# Patient Record
Sex: FEMALE | Race: WHITE | Employment: PART TIME
[De-identification: names, ages, dates, MRNs, and addresses within clinical notes are randomized per-mention and may not be internally consistent; named-entity substitution may affect disease eponyms.]

---

## 2017-01-03 ENCOUNTER — TELEPHONE (OUTPATIENT)
Dept: OBGYN | Facility: CLINIC | Age: 42
End: 2017-01-03

## 2017-01-03 DIAGNOSIS — N76.0 BV (BACTERIAL VAGINOSIS): Primary | ICD-10-CM

## 2017-01-03 DIAGNOSIS — B96.89 BV (BACTERIAL VAGINOSIS): Primary | ICD-10-CM

## 2017-01-03 RX ORDER — METRONIDAZOLE 500 MG/1
500 TABLET ORAL 2 TIMES DAILY
Qty: 14 TABLET | Refills: 0 | Status: SHIPPED | OUTPATIENT
Start: 2017-01-03 | End: 2017-01-10

## 2017-03-01 ENCOUNTER — TELEPHONE (OUTPATIENT)
Dept: OBGYN | Facility: CLINIC | Age: 42
End: 2017-03-01

## 2017-03-01 DIAGNOSIS — R35.0 FREQUENT URINATION: ICD-10-CM

## 2017-03-01 DIAGNOSIS — R39.15 URGENCY OF URINATION: ICD-10-CM

## 2017-03-01 DIAGNOSIS — R39.89 SENSATION OF PRESSURE IN BLADDER AREA: Primary | ICD-10-CM

## 2017-03-01 RX ORDER — NITROFURANTOIN 25; 75 MG/1; MG/1
100 CAPSULE ORAL 2 TIMES DAILY
Qty: 10 CAPSULE | Refills: 0 | Status: SHIPPED | OUTPATIENT
Start: 2017-03-01 | End: 2017-03-06

## 2017-03-03 ENCOUNTER — TELEPHONE (OUTPATIENT)
Dept: OBGYN | Facility: CLINIC | Age: 42
End: 2017-03-03

## 2017-12-14 ENCOUNTER — OFFICE VISIT (OUTPATIENT)
Dept: OBGYN CLINIC | Age: 42
End: 2017-12-14
Payer: COMMERCIAL

## 2017-12-14 ENCOUNTER — HOSPITAL ENCOUNTER (OUTPATIENT)
Age: 42
Setting detail: SPECIMEN
Discharge: HOME | End: 2017-12-14
Payer: COMMERCIAL

## 2017-12-14 VITALS
WEIGHT: 133.8 LBS | DIASTOLIC BLOOD PRESSURE: 60 MMHG | SYSTOLIC BLOOD PRESSURE: 96 MMHG | BODY MASS INDEX: 22.29 KG/M2 | HEIGHT: 65 IN

## 2017-12-14 DIAGNOSIS — Z12.39 SCREENING FOR BREAST CANCER: ICD-10-CM

## 2017-12-14 DIAGNOSIS — Z01.419 VISIT FOR GYNECOLOGIC EXAMINATION: Primary | ICD-10-CM

## 2017-12-14 PROCEDURE — 99396 PREV VISIT EST AGE 40-64: CPT | Performed by: OBSTETRICS & GYNECOLOGY

## 2017-12-14 ASSESSMENT — ENCOUNTER SYMPTOMS
SHORTNESS OF BREATH: 0
CONSTIPATION: 0
DIARRHEA: 0
BACK PAIN: 0
COUGH: 0
ABDOMINAL PAIN: 0
ABDOMINAL DISTENTION: 0

## 2017-12-14 NOTE — PROGRESS NOTES
Subjective:      Patient ID: Meche Serna is a 38 y.o. female. HPI   Meche Serna is a 38 y.o. , here for her annual exam.  The patient was seen and examined. The patients past medical, surgical, social and family history were reviewed. Current medications and allergies were reviewed, and documented in the chart. The patient still has no menses after the ablation. She does still have occasional pain of the endometriosis, mostly in the right lower quadrant, but it is tolerable. Her children are 0 and still into sports. Her  is also a referee, so it is a very busy time  Menstrual history: No menses since ablation. She knows her cycles  Birth control: BTL    Blood pressure 96/60, height 5' 5\" (1.651 m), weight 133 lb 12.8 oz (60.7 kg), not currently breastfeeding.   Wt Readings from Last 3 Encounters:   17 133 lb 12.8 oz (60.7 kg)   16 123 lb (55.8 kg)   12/01/15 133 lb (60.3 kg)     Recent Results (from the past 8736 hour(s))   UA W/REFLEX CULTURE    Collection Time: 17  5:01 PM   Result Value Ref Range    Color, UA YELLOW YEL    Turbidity UA CLOUDY (A) CLEAR    Glucose, Ur NEGATIVE NEG    Bilirubin Urine NEGATIVE NEG    Ketones, Urine NEGATIVE NEG    Specific Gravity, UA 1.015 1.005 - 1.030    Urine Hgb 3+ (A) NEG    pH, UA 6.0 5.0 - 8.0    Protein, UA 1+ (A) NEG    Urobilinogen, Urine Normal NORM    Nitrite, Urine NEGATIVE NEG    Leukocyte Esterase, Urine LARGE (A) NEG    Urinalysis Comments NOT REPORTED    Microscopic Urinalysis    Collection Time: 17  5:01 PM   Result Value Ref Range    -          WBC, UA 50  0 - 5 /HPF    RBC, UA 20 TO 50 0 - 2 /HPF    Casts UA NOT REPORTED /LPF    Crystals UA NOT REPORTED NONE /HPF    Epithelial Cells UA 10 TO 20 /HPF    Renal Epithelial, Urine NOT REPORTED 0 /HPF    Bacteria, UA MODERATE (A) NONE    Mucus, UA NOT REPORTED NONE    Trichomonas, UA NOT REPORTED NONE    Amorphous, UA NOT REPORTED NONE    Other Observations UA NOT REPORTED NREQ    Yeast, UA NOT REPORTED NONE   Urine culture clean catch    Collection Time: 03/01/17  5:27 PM   Result Value Ref Range    Specimen Description       . CLEAN CATCH URINE Performed at [address]    Specimen Description  [facility], [address] [facility] [phone]     Special Requests NOT REPORTED     Culture ESCHERICHIA COLI 50 to 100,000 CFU/ML (A)     Culture       Performed at [facility] [address], [address] [place] [address] [phone]    Status FINAL 03/03/2017     Organism EC        Susceptibility    Escherichia coli - JOSE ROBERTO     amikacin NOT REPORTED       ampicillin >=32 RESISTANT Resistant      ampicillin-sulbactam NOT REPORTED       aztreonam <=1 SUSCEPTIBLE Sensitive      ceFAZolin Value in next row Intermediate       16 INTERMEDIATEInterpretive criteria when Cefazolin results are used for the therapy of uncomplicated UTIs due to E. coli, K. pneumoniae, and P. mirabilis or to predict the potential effectiveness of oral cephalosporins (eg. Cephalexin) due to E. coli, K. pneumoniae, and P. mirabilis. cefepime Value in next row        16 INTERMEDIATEInterpretive criteria when Cefazolin results are used for the therapy of uncomplicated UTIs due to E. coli, K. pneumoniae, and P. mirabilis or to predict the potential effectiveness of oral cephalosporins (eg. Cephalexin) due to E. coli, K. pneumoniae, and P. mirabilis. cefTRIAXone Value in next row Sensitive       16 INTERMEDIATEInterpretive criteria when Cefazolin results are used for the therapy of uncomplicated UTIs due to E. coli, K. pneumoniae, and P. mirabilis or to predict the potential effectiveness of oral cephalosporins (eg. Cephalexin) due to E. coli, K. pneumoniae, and P. mirabilis.      ciprofloxacin Value in next row Sensitive       16 INTERMEDIATEInterpretive criteria when Cefazolin results are used for the therapy of uncomplicated UTIs due to E. coli, K. pneumoniae, and P. Tobacco    Never Used     History   Alcohol Use    Yes     Comment: rarely       MEDICATIONS:  Current Outpatient Prescriptions   Medication Sig Dispense Refill    Fish Oil OIL by Does not apply route.  Calcium Carbonate-Vitamin D (CALCIUM + D PO) Take  by mouth.  Multiple Vitamin (MULTI VITAMIN DAILY PO) Take  by mouth.  ibuprofen (ADVIL;MOTRIN) 200 MG tablet Take 200 mg by mouth every 8 hours as needed. No current facility-administered medications for this visit. ALLERGIES:  Review of patient's allergies indicates no known allergies. Review of Systems   Constitutional: Negative for appetite change and fatigue. HENT: Negative for congestion and hearing loss. Eyes: Negative for visual disturbance. Respiratory: Negative for cough and shortness of breath. Cardiovascular: Negative for chest pain and palpitations. Gastrointestinal: Negative for abdominal distention, abdominal pain, constipation and diarrhea. Genitourinary: Negative for flank pain, frequency, menstrual problem, pelvic pain and vaginal discharge. Musculoskeletal: Negative for back pain. Neurological: Negative for syncope and headaches. Psychiatric/Behavioral: Negative for behavioral problems. Objective:   Physical Exam   Constitutional: She is oriented to person, place, and time. She appears well-developed and well-nourished. Eyes: Pupils are equal, round, and reactive to light. Neck: No tracheal deviation present. No thyromegaly present. Cardiovascular: Normal rate, regular rhythm and normal heart sounds. Pulmonary/Chest: Effort normal and breath sounds normal. No respiratory distress. Right breast exhibits no inverted nipple. Left breast exhibits no inverted nipple, no mass, no nipple discharge, no skin change and no tenderness. Breasts are symmetrical.   Abdominal: Soft. Bowel sounds are normal. She exhibits no distension and no mass. There is no tenderness.  There is no CVA

## 2017-12-27 DIAGNOSIS — Z01.419 VISIT FOR GYNECOLOGIC EXAMINATION: ICD-10-CM

## 2017-12-27 LAB — CYTOLOGY REPORT: NORMAL

## 2018-01-17 DIAGNOSIS — Z12.31 SCREENING MAMMOGRAM, ENCOUNTER FOR: ICD-10-CM

## 2018-01-17 DIAGNOSIS — Z80.3 FH: BREAST CANCER: ICD-10-CM

## 2018-03-15 ENCOUNTER — OFFICE VISIT (OUTPATIENT)
Dept: OBGYN CLINIC | Age: 43
End: 2018-03-15
Payer: COMMERCIAL

## 2018-03-15 ENCOUNTER — HOSPITAL ENCOUNTER (OUTPATIENT)
Age: 43
Setting detail: SPECIMEN
Discharge: HOME | End: 2018-03-15
Payer: COMMERCIAL

## 2018-03-15 VITALS
HEIGHT: 65 IN | WEIGHT: 132 LBS | SYSTOLIC BLOOD PRESSURE: 100 MMHG | DIASTOLIC BLOOD PRESSURE: 70 MMHG | BODY MASS INDEX: 21.99 KG/M2

## 2018-03-15 DIAGNOSIS — N94.89 LABIAL SWELLING: ICD-10-CM

## 2018-03-15 DIAGNOSIS — N94.89 LABIAL SWELLING: Primary | ICD-10-CM

## 2018-03-15 PROCEDURE — 99213 OFFICE O/P EST LOW 20 MIN: CPT | Performed by: OBSTETRICS & GYNECOLOGY

## 2018-03-15 NOTE — PROGRESS NOTES
Patient is a 38 y.o. Benja Orona  seen with total face to face time of 15 minutes. More than 50% of this visit was on counseling and education regarding her   1. Labial swelling  Herpes Simplex Virus (HSV) I/II Antibodies IgG & IgM    and her options. She was also counseled on her preventative health maintenance recommendations and follow-up. Blood pressure 100/70, height 5' 5\" (1.651 m), weight 132 lb (59.9 kg), not currently breastfeeding. Recent Results (from the past 8736 hour(s))   GYN Cytology    Collection Time: 12/14/17  1:21 PM   Result Value Ref Range    Cytology Report       (NOTE)  [id number]  Informative  [facility]  ANATOMIC PATHOLOGY  [address] [place] [address], [facility] [address]. [place], [address]  [phone]  Fax: [phone]  GYNECOLOGIC CYTOLOGY REPORT    Patient Name: Gurpreet Sam  MR#: [id number]  Specimen #[id number]  Source:  1: Cervical material, (ThinPrep vial, Imaging-assisted review)    Clinical History  Z01.419 Routine gyn exam without abnormal findings  High risk HPV DNA testing is requested if the diagnosis is abnormal    INTERPRETATION    Cervical material, (ThinPrep vial, Imaging-assisted review):  Specimen Adequacy:      Satisfactory for evaluation.      - Endocervical/transformation zone component present. Descriptive Diagnosis:      Negative for intraepithelial lesion or malignancy. Cytotechnologist:   NAHED Reid(ASCP)  **Electronically Signed Out**  mk/12/27/2017         Patient presents with lesions around the rectum and the left labia which started 4 days ago. She denies fever or trouble urinating. She also has a discharge. I did cultures and affirm, but on exam, I feel they are nothing more than small bacterial inclusions. They could be HPV condylomata and less likely herpes.   I will draw herpes titers to rule out out, and did affirm and cultures to rule out other infections, but I suggest that she probably would do best to just

## 2018-03-16 LAB
C TRACH DNA GENITAL QL NAA+PROBE: NEGATIVE
DIRECT EXAM: ABNORMAL
Lab: ABNORMAL
N. GONORRHOEAE DNA: NEGATIVE
SPECIMEN DESCRIPTION: ABNORMAL
STATUS: ABNORMAL

## 2018-03-19 DIAGNOSIS — B96.89 BV (BACTERIAL VAGINOSIS): Primary | ICD-10-CM

## 2018-03-19 DIAGNOSIS — N76.0 BV (BACTERIAL VAGINOSIS): Primary | ICD-10-CM

## 2018-03-19 LAB
HERPES SIMPLEX VIRUS 1 IGG: 0.13
HERPES SIMPLEX VIRUS 2 IGG: 0.1
HERPES TYPE 1/2 IGM COMBINED: 1.07

## 2018-03-19 RX ORDER — METRONIDAZOLE 500 MG/1
500 TABLET ORAL 2 TIMES DAILY
Qty: 14 TABLET | Refills: 0 | Status: SHIPPED | OUTPATIENT
Start: 2018-03-19 | End: 2018-03-26

## 2018-03-20 DIAGNOSIS — Z11.59: Primary | ICD-10-CM

## 2018-04-03 ENCOUNTER — HOSPITAL ENCOUNTER (OUTPATIENT)
Age: 43
Setting detail: SPECIMEN
Discharge: HOME | End: 2018-04-03
Payer: COMMERCIAL

## 2018-04-03 DIAGNOSIS — Z11.59: ICD-10-CM

## 2018-04-05 LAB
HERPES SIMPLEX VIRUS 1 IGG: 0.34
HERPES SIMPLEX VIRUS 2 IGG: 2.84
HERPES TYPE 1/2 IGM COMBINED: 4.37

## 2018-04-06 RX ORDER — VALACYCLOVIR HYDROCHLORIDE 500 MG/1
500 TABLET, FILM COATED ORAL 2 TIMES DAILY
Qty: 14 TABLET | Refills: 0 | Status: SHIPPED | OUTPATIENT
Start: 2018-04-06 | End: 2018-04-13

## 2018-04-16 ENCOUNTER — OFFICE VISIT (OUTPATIENT)
Dept: OBGYN CLINIC | Age: 43
End: 2018-04-16
Payer: COMMERCIAL

## 2018-04-16 VITALS
BODY MASS INDEX: 22.19 KG/M2 | WEIGHT: 133.2 LBS | HEIGHT: 65 IN | SYSTOLIC BLOOD PRESSURE: 118 MMHG | DIASTOLIC BLOOD PRESSURE: 82 MMHG

## 2018-04-16 DIAGNOSIS — A60.09 HERPES GENITALIS IN WOMEN: Primary | ICD-10-CM

## 2018-04-16 PROCEDURE — 99213 OFFICE O/P EST LOW 20 MIN: CPT | Performed by: OBSTETRICS & GYNECOLOGY

## 2018-12-19 ENCOUNTER — OFFICE VISIT (OUTPATIENT)
Dept: OBGYN CLINIC | Age: 43
End: 2018-12-19
Payer: COMMERCIAL

## 2018-12-19 ENCOUNTER — HOSPITAL ENCOUNTER (OUTPATIENT)
Age: 43
Setting detail: SPECIMEN
Discharge: HOME | End: 2018-12-19
Payer: COMMERCIAL

## 2018-12-19 VITALS
SYSTOLIC BLOOD PRESSURE: 110 MMHG | DIASTOLIC BLOOD PRESSURE: 70 MMHG | HEIGHT: 65 IN | WEIGHT: 127.2 LBS | BODY MASS INDEX: 21.19 KG/M2

## 2018-12-19 DIAGNOSIS — Z12.39 SCREENING FOR BREAST CANCER: ICD-10-CM

## 2018-12-19 DIAGNOSIS — Z01.419 VISIT FOR GYNECOLOGIC EXAMINATION: Primary | ICD-10-CM

## 2018-12-19 DIAGNOSIS — R10.2 PELVIC PAIN IN FEMALE: ICD-10-CM

## 2018-12-19 PROCEDURE — 99396 PREV VISIT EST AGE 40-64: CPT | Performed by: OBSTETRICS & GYNECOLOGY

## 2018-12-19 ASSESSMENT — ENCOUNTER SYMPTOMS
ABDOMINAL PAIN: 0
SHORTNESS OF BREATH: 0
CONSTIPATION: 0
DIARRHEA: 0
BACK PAIN: 0
ABDOMINAL DISTENTION: 0
COUGH: 0

## 2018-12-19 NOTE — PROGRESS NOTES
Subjective:      Patient ID: Genaro Walls is a 37 y.o. female. HPI   Genaro Walls is a 37 y.o. , here for her annual exam.  The patient was seen and examined. The patients past medical, surgical, social and family history were reviewed. Current medications and allergies were reviewed, and documented in the chart. The patient has no menses since her ablation. However, she still knows that she cycles, and she gets pain on alternating sides once a month and can shoot down to the leg. She does have a diagnosis of endometriosis from laparoscopy some years ago. She says the pain does not interfere with her life or sex. I offered to do another laparoscopy, or give her treatment for endometriosis and she finds neither necessary at this point. More, she will agree to do a laparoscopy to be sure there is no other problems. Her children are doing well. Her son who 6 inches last year. He is now 15 and her daughter is 5, both are interested in sports. Menstrual history: No bleeding since ablation  Birth control: Bilateral tubal ligation    Blood pressure 110/70, height 5' 5\" (1.651 m), weight 127 lb 3.2 oz (57.7 kg), not currently breastfeeding. Wt Readings from Last 3 Encounters:   18 127 lb 3.2 oz (57.7 kg)   18 133 lb 3.2 oz (60.4 kg)   03/15/18 132 lb (59.9 kg)     Recent Results (from the past 8736 hour(s))   HERPES PROFILE    Collection Time: 03/15/18  3:30 PM   Result Value Ref Range    HSV I IgG 0.13 <0.91    HSV II IgG 0.10 <0.91    Herpes Type 1/2 IgM Combined 1.07 (H) <0.91   VAGINITIS DNA PROBE    Collection Time: 03/15/18 10:47 PM   Result Value Ref Range    Specimen Description . VAGINA     Special Requests NOT REPORTED     Direct Exam POSITIVE for Gardnerella vaginalis. (A)     Direct Exam NEGATIVE for Trichomonas vaginalis     Direct Exam NEGATIVE for Candida sp.      Direct Exam       Method of testing is a DNA probe intended for detection and identification of

## 2019-01-07 ENCOUNTER — PROCEDURE VISIT (OUTPATIENT)
Dept: OBGYN CLINIC | Age: 44
End: 2019-01-07
Payer: COMMERCIAL

## 2019-01-07 DIAGNOSIS — R10.2 PELVIC PAIN IN FEMALE: ICD-10-CM

## 2019-01-07 PROCEDURE — 76830 TRANSVAGINAL US NON-OB: CPT | Performed by: OBSTETRICS & GYNECOLOGY

## 2019-01-07 PROCEDURE — 76857 US EXAM PELVIC LIMITED: CPT | Performed by: OBSTETRICS & GYNECOLOGY

## 2019-01-08 LAB
HPV SAMPLE: NORMAL
HPV SOURCE: NORMAL
HPV, GENOTYPE 16: NOT DETECTED
HPV, GENOTYPE 18: NOT DETECTED
HPV, HIGH RISK OTHER: NOT DETECTED
HPV, INTERPRETATION: NORMAL

## 2019-01-09 LAB — CYTOLOGY REPORT: NORMAL

## 2019-02-14 DIAGNOSIS — Z12.39 SCREENING FOR BREAST CANCER: ICD-10-CM

## 2019-12-23 ENCOUNTER — HOSPITAL ENCOUNTER (OUTPATIENT)
Age: 44
Setting detail: SPECIMEN
Discharge: HOME | End: 2019-12-23
Payer: COMMERCIAL

## 2019-12-23 ENCOUNTER — OFFICE VISIT (OUTPATIENT)
Dept: OBGYN CLINIC | Age: 44
End: 2019-12-23
Payer: COMMERCIAL

## 2019-12-23 VITALS
SYSTOLIC BLOOD PRESSURE: 112 MMHG | BODY MASS INDEX: 21.33 KG/M2 | WEIGHT: 128 LBS | DIASTOLIC BLOOD PRESSURE: 70 MMHG | HEIGHT: 65 IN

## 2019-12-23 DIAGNOSIS — Z01.419 WOMEN'S ANNUAL ROUTINE GYNECOLOGICAL EXAMINATION: Primary | ICD-10-CM

## 2019-12-23 DIAGNOSIS — Z12.31 ENCOUNTER FOR SCREENING MAMMOGRAM FOR BREAST CANCER: ICD-10-CM

## 2019-12-23 PROCEDURE — 99396 PREV VISIT EST AGE 40-64: CPT | Performed by: OBSTETRICS & GYNECOLOGY

## 2019-12-23 ASSESSMENT — ENCOUNTER SYMPTOMS
COUGH: 0
SHORTNESS OF BREATH: 0
DIARRHEA: 0
BACK PAIN: 0
CONSTIPATION: 0
ABDOMINAL DISTENTION: 0
ABDOMINAL PAIN: 0

## 2020-01-06 LAB — CYTOLOGY REPORT: NORMAL

## 2020-03-31 RX ORDER — VALACYCLOVIR HYDROCHLORIDE 500 MG/1
500 TABLET, FILM COATED ORAL 2 TIMES DAILY
Qty: 14 TABLET | Refills: 0 | Status: SHIPPED | OUTPATIENT
Start: 2020-03-31 | End: 2020-04-07

## 2021-01-11 ENCOUNTER — OFFICE VISIT (OUTPATIENT)
Dept: OBGYN CLINIC | Age: 46
End: 2021-01-11
Payer: COMMERCIAL

## 2021-01-11 VITALS
WEIGHT: 141 LBS | BODY MASS INDEX: 23.49 KG/M2 | HEIGHT: 65 IN | SYSTOLIC BLOOD PRESSURE: 112 MMHG | DIASTOLIC BLOOD PRESSURE: 58 MMHG

## 2021-01-11 DIAGNOSIS — Z12.31 ENCOUNTER FOR SCREENING MAMMOGRAM FOR BREAST CANCER: ICD-10-CM

## 2021-01-11 DIAGNOSIS — Z01.419 ENCOUNTER FOR GYNECOLOGICAL EXAMINATION: Primary | ICD-10-CM

## 2021-01-11 PROCEDURE — 99396 PREV VISIT EST AGE 40-64: CPT | Performed by: STUDENT IN AN ORGANIZED HEALTH CARE EDUCATION/TRAINING PROGRAM

## 2021-01-11 RX ORDER — VALACYCLOVIR HYDROCHLORIDE 1 G/1
TABLET, FILM COATED ORAL
COMMUNITY
Start: 2020-12-29

## 2021-01-11 RX ORDER — RIZATRIPTAN BENZOATE 10 MG/1
TABLET ORAL
COMMUNITY
Start: 2020-12-29

## 2021-01-11 NOTE — PROGRESS NOTES
[facility]   [address]  [facility], [address]      Elysia Peralta  2023                       Primary Care Physician: Janina Jane    CC:   Chief Complaint   Patient presents with    Annual Exam     Prev Pap: 19 WNL; Prev Christophe: 3/17/20 WNL         HPI: Elysia Peralta is a 50 y.o. female  No LMP recorded. Patient has had an ablation. The patient was seen and examined. She is here for an annual visit. She is complaining of nothing. Patient had an endometrial ablation in  and has had no bleeding since that time. Patient had a tubal ligation in  following the birth of her daughter. Patient does admit to a history of heavy and painful periods, which have now completely resolved since her ablation. Her bowel habits are regular. She denies any bloating. She denies dysuria. She denies urinary leaking. She denies vaginal discharge. She is sexually active with single partner, contraception - tubal ligation. She uses bilateral tubal ligation for contraception and is not desiring pregnancy.     Depression Screen: Symptoms of decreased mood absent  Symptoms of anhedonia absent  **If either question is answered in a  positive fashion then complete the PHQ9 Scoring Evaluation and make the appropriate referral**    REVIEW OF SYSTEMS:   Constitutional: negative fever, negative chills  HEENT: negative visual disturbances, negative headaches  Respiratory: negative dyspnea, negative cough  Cardiovascular: negative chest pain,  negative palpitations  Gastrointestinal: negative abdominal pain, negative RUQ pain, negative N/V, negative diarrhea, negative constipation  Genitourinary: negative dysuria, negative vaginal discharge  Dermatological: negative rash  Hematologic: negative bruising  Immunologic/Lymphatic: negative recent illness, negative recent sick contact  Musculoskeletal: negative back pain, negative myalgias, negative arthralgias  Neurological: negative dizziness, negative weakness  Behavior/Psych: negative depression, negative anxiety      GYNECOLOGICAL HISTORY:  Age of Menarche: 13  Age of Menopause: 50     STD History: no past history    Pap History: Last PAP was normal; 2019. Colposcopy History: denies    Permanent Sterilization: yes - tubal ligation  Reversible Birth Control: no  Hormone Replacement Exposure: no    OBSTETRICAL HISTORY:  OB History    Para Term  AB Living   2 2 1 0 0 2   SAB TAB Ectopic Molar Multiple Live Births   0 0 0 0 0 2      # Outcome Date GA Lbr Deep/2nd Weight Sex Delivery Anes PTL Lv   2 Term 2009 37w0d   F CS-LTranv   PATRIZIA   1 Para 2006   9 lb 5 oz (4.224 kg) M CS-LTranv   PATRIZIA       PAST MEDICAL HISTORY:   has a past medical history of Endometriosis, HSV-2 infection, and Migraine headache. PAST SURGICAL HISTORY:   has a past surgical history that includes  section (2006); hysteroscopy (9944,0161); Endometrial ablation (2011); laparoscopy (); and [place] tooth extraction. ALLERGIES:  has No Known Allergies. MEDICATIONS:  Prior to Admission medications    Medication Sig Start Date End Date Taking? Authorizing Provider   Calcium Carbonate-Vitamin D (CALCIUM + D PO) Take  by mouth. Yes Historical Provider, MD   Multiple Vitamin (MULTI VITAMIN DAILY PO) Take  by mouth. Yes Historical Provider, MD   ibuprofen (ADVIL;MOTRIN) 200 MG tablet Take 200 mg by mouth every 8 hours as needed. Yes Historical Provider, MD   rizatriptan (MAXALT) 10 MG tablet  20   Historical Provider, MD   valACYclovir (VALTREX) 1 g tablet  20   Historical Provider, MD       FAMILY HISTORY:  Family History of Breast, Ovarian, Colon or Uterine Cancer: Yes maternal aunt   family history includes Breast Cancer (age of onset: 61) in her maternal aunt; Heart Attack in her maternal grandfather; Hypertension in her father and mother.     SOCIAL HISTORY:   reports that she has never smoked. She has never used smokeless tobacco. She reports current alcohol use. She reports that she does not use drugs. HEALTH MAINTENANCE:  Immunization status: stated as up to date, no records available    [address]: 3/2020 BIRADS 1. Next due 3/2021. Colonoscopy: N/A  Pap Smears: Last 2019 Neg. Nex due 2022. Family Planning: Tubal ligation  DEXA: N/A    VITALS:  Vitals:    21 0805   BP: (!) 112/58   Position: Sitting   Cuff Size: Medium Adult   Weight: 141 lb (64 kg)   Height: 5' 5\" (1.651 m)                                                                                                                                                                                                        PHYSICAL EXAM:   General Appearance: Appears healthy. Alert; in no acute distress. Pleasant. Skin: Skin color, texture, turgor normal. No rashes or lesions. HEENT: normocephalic and atraumatic, Thyroid normal to inspection and palpation  Respiratory: Normal expansion. Clear to auscultation. No rales, rhonchi, or wheezing. Cardiovascular: normal rate, normal S1 and S2, no gallops, intact distal pulses and no carotid bruits  Breast:  (Chest): Declined  Abdomen: soft, non-tender, non-distended, no right upper quadrant tenderness and no CVA tenderness  Pelvic Exam:   External genitalia: General appearance; normal, Hair distribution; normal, Lesions absent  Urinary system: urethral meatus normal  Vaginal: normal mucosa, no discharge  Cervix: normal appearing cervix without discharge or lesions  Adnexa: non-palpable  Uterus: normal single, nontender  Rectal Exam: exam declined by patient  Musculoskeletal: no gross abnormalities  Extremities: non-tender BLE and non-edematous  Psych:  oriented to time, place and person     DATA:  No results found for this visit on 21. ASSESSMENT & PLAN:    Patience Grijalva is a 50 y.o. female  No LMP recorded.  Patient has had

## 2022-03-17 ENCOUNTER — OFFICE VISIT (OUTPATIENT)
Dept: OBGYN CLINIC | Age: 47
End: 2022-03-17
Payer: COMMERCIAL

## 2022-03-17 VITALS
BODY MASS INDEX: 23.49 KG/M2 | SYSTOLIC BLOOD PRESSURE: 112 MMHG | HEIGHT: 65 IN | DIASTOLIC BLOOD PRESSURE: 78 MMHG | WEIGHT: 141 LBS

## 2022-03-17 DIAGNOSIS — Z01.419 ENCOUNTER FOR WELL WOMAN EXAM: Primary | ICD-10-CM

## 2022-03-17 DIAGNOSIS — Z12.31 ENCOUNTER FOR SCREENING MAMMOGRAM FOR BREAST CANCER: ICD-10-CM

## 2022-03-17 LAB — PAP SMEAR: ABNORMAL

## 2022-03-17 PROCEDURE — 99396 PREV VISIT EST AGE 40-64: CPT | Performed by: NURSE PRACTITIONER

## 2022-03-17 ASSESSMENT — ENCOUNTER SYMPTOMS
ABDOMINAL PAIN: 0
COUGH: 0
CONSTIPATION: 0
SHORTNESS OF BREATH: 0
ABDOMINAL DISTENTION: 0
BACK PAIN: 0
DIARRHEA: 0

## 2022-03-17 NOTE — PROGRESS NOTES
Chaperone for Intimate Exam   Chaperone was offered as part of the rooming process. Patient declined and agrees to continue with exam without a chaperone.    Chaperone: n/a

## 2022-03-17 NOTE — PROGRESS NOTES
[facility]  [address] [place] [facility], [address] [place] [address] [facility]  [address] [facility] [address] [facility]  Dept: [phone]  DATE OF VISIT:  3/17/22        History and Physical    Alejandra Luevano    :  1975  CHIEF COMPLAINT:    Chief Complaint   Patient presents with    Annual Exam     p 19 neg m 3-17-20 neg FHx BrCa MAunt                     HPI :       Alejandra Luevano is a 48 y.o. female presents for her annual exam.     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 Has 2 children- . Stay very active and eats healthy. Menarche 13. She reports no cycles r/t ablation. Denies dysmenorrhea. Denies menorrhagia  Denies hot flushes or vaginal dryness. Uses BLTL for contraception. Pt states thatlabs ordered by her PCP indicated a menopausal state. Denies symptoms at this time.   _____________________________________________________________________  Past Medical History:   Diagnosis Date    Endometriosis     HSV-2 infection     Migraine headache     history of                                                                   Past Surgical History:   Procedure Laterality Date     SECTION  2006    with tubal    ENDOMETRIAL ABLATION  2011    HYSTEROSCOPY  8765,0814    with d&c, laparscopy,with instillation of dye through tubes    LAPAROSCOPY  2009    WISDOM TOOTH EXTRACTION       Family History   Problem Relation Age of Onset    Hypertension Mother     Hypertension Father     No Known Problems Brother     Heart Attack Maternal Grandfather     Breast Cancer Maternal Aunt 61    Other Paternal Aunt         breast lump removed waiting on results     Social History     Tobacco Use   Smoking Status Never Smoker   Smokeless Tobacco Never Used     Social History     Substance and Sexual Activity   Alcohol Use Yes     Current Outpatient Medications   Medication Sig Dispense Refill    rizatriptan (MAXALT) 10 MG tablet -for migraines      valACYclovir (VALTREX) 1 g tablet       Calcium Carbonate-Vitamin D (CALCIUM + D PO) Take  by mouth.  Multiple Vitamin (MULTI VITAMIN DAILY PO) Take  by mouth.  ibuprofen (ADVIL;MOTRIN) 200 MG tablet Take 200 mg by mouth every 8 hours as needed. No current facility-administered medications for this visit. Allergies:  Patient has no known allergies. Gynecologic History:  No LMP recorded. Patient has had an ablation. Sexually Active:Yes  How many partners in the last 12 months- 1  Partners: monogamous  Discussed using condoms for STI protection NA  Dyspareunia: denies  STD History: No  Pap Smear History: 2019 NILM  Mammogram: 3.2020  BIRAD 2  Colonoscopy: discuss  Fam Hx Breast, Ovarian, Colorectal Ca: MGM and PGM aunts both dx BrCa    OB History    Para Term  AB Living   2 2 1 0 0 2   SAB IAB Ectopic Molar Multiple Live Births   0 0 0 0 0 2       Review of Systems   Constitutional: Negative for appetite change and fatigue. HENT: Negative for congestion and hearing loss. Eyes: Negative for visual disturbance. Respiratory: Negative for cough and shortness of breath. Cardiovascular: Negative for chest pain and palpitations. Gastrointestinal: Negative for abdominal distention, abdominal pain, constipation and diarrhea. Genitourinary: Negative for flank pain, frequency, menstrual problem, pelvic pain and vaginal discharge. Musculoskeletal: Negative for back pain. Neurological: Negative for syncope and headaches. Psychiatric/Behavioral: Negative for behavioral problems. /78 (Site: Right Upper Arm, Position: Sitting, Cuff Size: Small Adult)   Ht 5' 5\" (1.651 m)   Wt 141 lb (64 kg)   BMI 23.46 kg/m²     Physical Exam  Constitutional:       Appearance: She is well-developed. HENT:      Head: Normocephalic. Eyes:      Extraocular Movements: Extraocular movements intact. Conjunctiva/sclera: Conjunctivae normal.   Neck:      Thyroid: No thyromegaly. Trachea: No tracheal deviation. Pulmonary:      Effort: Pulmonary effort is normal. No respiratory distress. Chest:   Breasts: Breasts are symmetrical.      Right: No inverted nipple. Left: No inverted nipple, mass, nipple discharge, skin change or tenderness. Abdominal:      General: There is no distension. Palpations: Abdomen is soft. There is no mass. Tenderness: There is no abdominal tenderness. Genitourinary:     Labia:         Right: No rash or lesion. Left: No rash or lesion. Vagina: No vaginal discharge or tenderness. Cervix: No cervical motion tenderness, discharge or friability. Uterus: Not deviated, not enlarged and not fixed. Adnexa:         Right: No mass, tenderness or fullness. Left: No mass, tenderness or fullness. Musculoskeletal:         General: No tenderness. Normal range of motion. Cervical back: Normal range of motion. Skin:     General: Skin is warm and dry. Neurological:      General: No focal deficit present. Mental Status: She is alert and oriented to person, place, and time. Mental status is at baseline. Psychiatric:         Mood and Affect: Mood normal.         Behavior: Behavior normal.         Thought Content: Thought content normal.         Judgment: Judgment normal.                 ASSESSMENT:    48 y.o. Female; Annual   Diagnosis Orders   1. Encounter for well woman exam  PAP SMEAR   2. Encounter for screening mammogram for breast cancer  JATIN Screening Bilateral     No follow-ups on file. Hereditary Breast, Ovarian, Colon and Uterine Cancer screening Done.           Tobacco & Secondary smoke risks reviewed; instructed on cessation and avoidance    - Pap collected per ASCCP guidelines.  -Discussed menopausal symptoms, HRT, incontinence. - Screening mammogram discussed and advised yearly if normalstarting at age 36.  - Calcium and Vitamin D dosing reviewed. - Colonoscopy screening reviewed. - General diet and exercise reviewed. - Routine health maintenance per patients PCP.     Electronically signed by OZ Huddleston CNP on 3/17/22 at 9:43 AM RICA [address]cell [address]  [address]

## 2022-03-24 DIAGNOSIS — Z01.419 ENCOUNTER FOR WELL WOMAN EXAM: ICD-10-CM

## 2022-05-23 DIAGNOSIS — Z12.31 ENCOUNTER FOR SCREENING MAMMOGRAM FOR BREAST CANCER: ICD-10-CM

## 2023-03-27 ENCOUNTER — OFFICE VISIT (OUTPATIENT)
Dept: OBGYN CLINIC | Age: 48
End: 2023-03-27
Payer: COMMERCIAL

## 2023-03-27 VITALS
SYSTOLIC BLOOD PRESSURE: 102 MMHG | WEIGHT: 147.7 LBS | HEIGHT: 65 IN | BODY MASS INDEX: 24.61 KG/M2 | DIASTOLIC BLOOD PRESSURE: 70 MMHG

## 2023-03-27 DIAGNOSIS — Z01.419 ENCOUNTER FOR WELL WOMAN EXAM: Primary | ICD-10-CM

## 2023-03-27 DIAGNOSIS — Z12.31 ENCOUNTER FOR SCREENING MAMMOGRAM FOR BREAST CANCER: ICD-10-CM

## 2023-03-27 DIAGNOSIS — Z80.3 FAMILY HISTORY OF BREAST CANCER: ICD-10-CM

## 2023-03-27 PROCEDURE — 99396 PREV VISIT EST AGE 40-64: CPT | Performed by: NURSE PRACTITIONER

## 2023-03-27 ASSESSMENT — ENCOUNTER SYMPTOMS
COUGH: 0
ABDOMINAL DISTENTION: 0
SHORTNESS OF BREATH: 0
GASTROINTESTINAL NEGATIVE: 1
RESPIRATORY NEGATIVE: 1
ALLERGIC/IMMUNOLOGIC NEGATIVE: 1
BACK PAIN: 0

## 2023-03-27 ASSESSMENT — PATIENT HEALTH QUESTIONNAIRE - PHQ9
1. LITTLE INTEREST OR PLEASURE IN DOING THINGS: 0
SUM OF ALL RESPONSES TO PHQ9 QUESTIONS 1 & 2: 0
SUM OF ALL RESPONSES TO PHQ QUESTIONS 1-9: 0
2. FEELING DOWN, DEPRESSED OR HOPELESS: 0
SUM OF ALL RESPONSES TO PHQ QUESTIONS 1-9: 0

## 2023-03-27 NOTE — PROGRESS NOTES
[address] [facility] [address]  [address] [facility] [address] [facility]  Dept: [phone]      3/27/23    [facility] Annual Exam      CHIEF COMPLAINT:    Chief Complaint   Patient presents with    Annual Exam     51 y/o Presents for Annual exam today:  p 3-17-22 ASCUS HPV- m 22 neg FHx BrCa MAunt               Blood pressure 102/70, height 5' 4.75\" (1.645 m), weight 147 lb 11.2 oz (67 kg), not currently breastfeeding. HPI :     Sandoval Forrester 1975 is a 50 y.o. Selin Hackett  who is here for her annual exam. She has had a previous ablation and is amenorrheic. Denies hot flushes or vaginal dryness. Uses tubal for contraception.      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  _____________________________________________________________________  Past Medical History:   Diagnosis Date    Endometriosis     HSV-2 infection     Migraine headache     history of                                                                   Past Surgical History:   Procedure Laterality Date     SECTION  2006    with tubal    COLONOSCOPY  2023    ENDOMETRIAL ABLATION  2011    HYSTEROSCOPY  [id number]    with d&c, laparscopy,with instillation of dye through tubes    LAPAROSCOPY  2009    WISDOM TOOTH EXTRACTION       Family History   Problem Relation Age of Onset    Hypertension Mother     Hypertension Father     No Known Problems Brother     Heart Attack Maternal Grandfather     Breast Cancer Maternal Aunt 61    Other Paternal Aunt         breast lump removed waiting on results     Social History     Tobacco Use   Smoking Status Never   Smokeless Tobacco Never     Social History     Substance and Sexual Activity   Alcohol Use Yes     Current Outpatient Medications   Medication Sig Dispense Refill    rizatriptan (MAXALT) 10 MG tablet -for migraines      valACYclovir (VALTREX) 1 g tablet

## 2024-03-28 ENCOUNTER — OFFICE VISIT (OUTPATIENT)
Dept: OBGYN CLINIC | Age: 49
End: 2024-03-28
Payer: COMMERCIAL

## 2024-03-28 VITALS
DIASTOLIC BLOOD PRESSURE: 76 MMHG | HEIGHT: 65 IN | SYSTOLIC BLOOD PRESSURE: 119 MMHG | WEIGHT: 150.4 LBS | BODY MASS INDEX: 25.06 KG/M2

## 2024-03-28 DIAGNOSIS — Z01.419 ENCOUNTER FOR GYNECOLOGICAL EXAMINATION: Primary | ICD-10-CM

## 2024-03-28 DIAGNOSIS — Z12.31 ENCOUNTER FOR SCREENING MAMMOGRAM FOR BREAST CANCER: ICD-10-CM

## 2024-03-28 PROCEDURE — 99396 PREV VISIT EST AGE 40-64: CPT | Performed by: NURSE PRACTITIONER

## 2024-03-28 ASSESSMENT — ENCOUNTER SYMPTOMS
ALLERGIC/IMMUNOLOGIC NEGATIVE: 1
BACK PAIN: 0
COUGH: 0
SHORTNESS OF BREATH: 0
ABDOMINAL DISTENTION: 0
GASTROINTESTINAL NEGATIVE: 1
RESPIRATORY NEGATIVE: 1

## 2024-03-28 ASSESSMENT — PATIENT HEALTH QUESTIONNAIRE - PHQ9
2. FEELING DOWN, DEPRESSED OR HOPELESS: NOT AT ALL
SUM OF ALL RESPONSES TO PHQ QUESTIONS 1-9: 0
1. LITTLE INTEREST OR PLEASURE IN DOING THINGS: NOT AT ALL
SUM OF ALL RESPONSES TO PHQ9 QUESTIONS 1 & 2: 0
SUM OF ALL RESPONSES TO PHQ QUESTIONS 1-9: 0

## 2024-03-28 NOTE — PROGRESS NOTES
Chaperone for Intimate Exam  Chaperone was offered as part of the rooming process. Patient declined and agrees to continue with exam without a chaperone.  Chaperone: NONE       
(MAXALT) 10 MG tablet -for migraines      valACYclovir (VALTREX) 1 g tablet       Calcium Carbonate-Vitamin D (CALCIUM + D PO) Take  by mouth.      Multiple Vitamin (MULTI VITAMIN DAILY PO) Take  by mouth.      ibuprofen (ADVIL;MOTRIN) 200 MG tablet Take 1 tablet by mouth every 8 hours as needed       No current facility-administered medications for this visit.       PHQ-9 Total Score: 0 (3/28/2024  8:04 AM)       **If either question is answered in a  positive fashion then complete the PHQ9 Scoring Evaluation and make the appropriate referral**        Allergies:  Patient has no known allergies.    Gynecologic History:  No LMP recorded. Patient has had an ablation.  Sexually Active:Yes  Dyspareunia: no  STD History: No  Colonoscopy:   Fam Hx Breast, Ovarian, Colorectal Ca: breast- aunts    OB History    Para Term  AB Living   2 2 1 0 0 2   SAB IAB Ectopic Molar Multiple Live Births   0 0 0 0 0 2       Review of Systems   Constitutional: Negative.  Negative for activity change, appetite change and fatigue.   HENT: Negative.     Respiratory: Negative.  Negative for cough and shortness of breath.    Cardiovascular: Negative.    Gastrointestinal: Negative.  Negative for abdominal distention.   Endocrine: Negative.    Genitourinary: Negative.    Musculoskeletal:  Negative for arthralgias and back pain.   Skin: Negative.    Allergic/Immunologic: Negative.    Neurological:  Negative for dizziness and light-headedness.         Physical Exam  Vitals reviewed.   Constitutional:       Appearance: Normal appearance.   HENT:      Head: Normocephalic.   Cardiovascular:      Rate and Rhythm: Normal rate and regular rhythm.   Pulmonary:      Effort: Pulmonary effort is normal.      Breath sounds: Normal breath sounds.   Chest:   Breasts:     Right: Normal. No mass, skin change or tenderness.      Left: Normal. No mass, skin change or tenderness.   Abdominal:      General: Abdomen is flat.      Palpations: Abdomen

## 2025-02-06 DIAGNOSIS — Z12.31 ENCOUNTER FOR SCREENING MAMMOGRAM FOR BREAST CANCER: ICD-10-CM

## 2025-03-28 NOTE — PROGRESS NOTES
[facility] [place]  [address]  [place] [address]  Dept: [phone]      3/28/25    Gayathri Lion     Gyn Annual Exam      CHIEF COMPLAINT:    Chief Complaint   Patient presents with    Annual Exam     Last pap 3/17/22 ASCUS HPV- last mammogram 25              Blood pressure 119/81, height 1.645 m (5' 4.75\"), weight 68.3 kg (150 lb 9.6 oz), not currently breastfeeding.           HPI :     Gayathri Lion 1975 is a 49 y.o.   who is here for her annual exam.  She has had a previous ablation and is amenorrheic.  She has no concerns.  She is struggling with frozen shoulder and sees ortho at the end of April  Last pap 3/17/2022 ascus negative hpv     Occasional hot flashes  Uses tubal for contraception.      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  Oldest at [place]  _____________________________________________________________________  Past Medical History:   Diagnosis Date    Endometriosis     HSV-2 infection     Migraine headache     history of                                                                   Past Surgical History:   Procedure Laterality Date     SECTION  2006    with tubal    COLONOSCOPY  2023    ENDOMETRIAL ABLATION  2011    HYSTEROSCOPY  ,    with d&c, laparscopy,with instillation of dye through tubes    LAPAROSCOPY  2009    WISDOM TOOTH EXTRACTION       Family History   Problem Relation Age of Onset    Hypertension Mother     Hypertension Father     No Known Problems Brother     Heart Attack Maternal Grandfather     Breast Cancer Maternal Aunt 60    Other Paternal Aunt         breast lump removed waiting on results     Social History     Tobacco Use   Smoking Status Never   Smokeless Tobacco Never     Social History     Substance and Sexual Activity   Alcohol Use Yes    Comment: Once a month (if that)     Current

## 2025-03-31 ENCOUNTER — HOSPITAL ENCOUNTER (OUTPATIENT)
Age: 50
Setting detail: SPECIMEN
Discharge: HOME | End: 2025-03-31

## 2025-03-31 ENCOUNTER — OFFICE VISIT (OUTPATIENT)
Dept: OBGYN CLINIC | Age: 50
End: 2025-03-31
Payer: COMMERCIAL

## 2025-03-31 VITALS
HEIGHT: 65 IN | DIASTOLIC BLOOD PRESSURE: 81 MMHG | WEIGHT: 150.6 LBS | BODY MASS INDEX: 25.09 KG/M2 | SYSTOLIC BLOOD PRESSURE: 119 MMHG

## 2025-03-31 DIAGNOSIS — Z01.419 ENCOUNTER FOR GYNECOLOGICAL EXAMINATION: Primary | ICD-10-CM

## 2025-03-31 DIAGNOSIS — Z12.31 ENCOUNTER FOR SCREENING MAMMOGRAM FOR BREAST CANCER: ICD-10-CM

## 2025-03-31 PROCEDURE — 99396 PREV VISIT EST AGE 40-64: CPT | Performed by: NURSE PRACTITIONER

## 2025-03-31 ASSESSMENT — ENCOUNTER SYMPTOMS
BACK PAIN: 0
RESPIRATORY NEGATIVE: 1
ALLERGIC/IMMUNOLOGIC NEGATIVE: 1
GASTROINTESTINAL NEGATIVE: 1
SHORTNESS OF BREATH: 0
COUGH: 0
ABDOMINAL DISTENTION: 0

## 2025-04-02 LAB
HPV I/H RISK 4 DNA CVX QL NAA+PROBE: NOT DETECTED
HPV SAMPLE: NORMAL
HPV, INTERPRETATION: NORMAL
HPV16 DNA CVX QL NAA+PROBE: NOT DETECTED
HPV18 DNA CVX QL NAA+PROBE: NOT DETECTED
SPECIMEN DESCRIPTION: NORMAL

## 2025-04-03 ENCOUNTER — RESULTS FOLLOW-UP (OUTPATIENT)
Dept: OBGYN CLINIC | Age: 50
End: 2025-04-03

## 2025-04-05 LAB — CYTOLOGY REPORT: NORMAL
